# Patient Record
Sex: FEMALE | URBAN - METROPOLITAN AREA
[De-identification: names, ages, dates, MRNs, and addresses within clinical notes are randomized per-mention and may not be internally consistent; named-entity substitution may affect disease eponyms.]

---

## 2024-11-18 ENCOUNTER — TELEPHONE (OUTPATIENT)
Dept: OBSTETRICS AND GYNECOLOGY | Facility: CLINIC | Age: 34
End: 2024-11-18

## 2024-11-18 NOTE — TELEPHONE ENCOUNTER
Pt calling to establish care      Currently 17 weeks, has had no prenatal care, but states she did go to ED around 7 weeks where pregnancy was confirmed. Pt ok with seeing male providers           Dr. Jett, are you able to take this pt under your care? Thank you

## 2024-11-18 NOTE — TELEPHONE ENCOUNTER
Pt calling to establish care     Currently 17 weeks, has had no prenatal care, but states she did go to ED around 7 weeks where pregnancy was confirmed. Pt ok with seeing male providers    Are you able to take this pt on under your care for the remainder of her pregnancy? Please advise, thank you!